# Patient Record
Sex: FEMALE | Race: WHITE | NOT HISPANIC OR LATINO | Employment: FULL TIME | URBAN - METROPOLITAN AREA
[De-identification: names, ages, dates, MRNs, and addresses within clinical notes are randomized per-mention and may not be internally consistent; named-entity substitution may affect disease eponyms.]

---

## 2017-09-08 ENCOUNTER — HOSPITAL ENCOUNTER (EMERGENCY)
Facility: HOSPITAL | Age: 27
Discharge: HOME | End: 2017-09-08
Attending: EMERGENCY MEDICINE
Payer: MEDICAID

## 2017-09-08 VITALS
BODY MASS INDEX: 21.91 KG/M2 | HEIGHT: 63 IN | HEART RATE: 90 BPM | SYSTOLIC BLOOD PRESSURE: 134 MMHG | TEMPERATURE: 98 F | DIASTOLIC BLOOD PRESSURE: 97 MMHG | OXYGEN SATURATION: 100 % | RESPIRATION RATE: 16 BRPM | WEIGHT: 123.69 LBS

## 2017-09-08 DIAGNOSIS — Z77.29 NATURAL GAS EXPOSURE: Primary | ICD-10-CM

## 2017-09-08 PROCEDURE — 99283 EMERGENCY DEPT VISIT LOW MDM: CPT

## 2017-09-08 PROCEDURE — 99282 EMERGENCY DEPT VISIT SF MDM: CPT

## 2017-09-09 NOTE — ED PROVIDER NOTES
"Encounter Date: 9/8/2017    SCRIBE #1 NOTE: I, Anabel Flanagan , am scribing for, and in the presence of,  Dr. Ho . I have scribed the entire note.       History     Chief Complaint   Patient presents with    Chemical Exposure     exposed to gas at friends house today; c/o feeling tired with photophobia       09/08/2017  9:46 PM     Chief Complaint: Chemical exposure       The patient is a 27 y.o. female who is presenting with to the ED s/p possible chemical exposure. The pt reports that she was at her boyfriends house and while getting ready for work "thought she smelt natural gas". The pt endorses a pounding and throbbing global HA, fatigue, and generalized weakness 2 hours after leaving the boyfriend's house. The pt reports that her sx have resolved since arriving to the hospital and being placed on 2L O2 NC and is in NAD. No pertinent PMHx, past surgical hx, or social hx.                 The history is provided by the patient and medical records.     Review of patient's allergies indicates:  No Known Allergies  Past Medical History:   Diagnosis Date    Abnormal Pap smear 3/2011    no treatment    Chronic kidney disease      Past Surgical History:   Procedure Laterality Date    KIDNEY SURGERY      ureteral junction obstruction at [facility] NO 2004     History reviewed. No pertinent family history.  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     Review of Systems   Constitutional: Positive for fatigue. Negative for fever.   HENT: Negative for sore throat.    Eyes: Negative for visual disturbance.   Respiratory: Negative for shortness of breath.    Cardiovascular: Negative for chest pain.   Gastrointestinal: Negative for nausea.   Genitourinary: Negative for dysuria.   Musculoskeletal: Negative for back pain.   Skin: " Negative for rash.   Neurological: Positive for weakness and headaches.   Hematological: Does not bruise/bleed easily.       Physical Exam     Initial Vitals [09/08/17 1923]   BP Pulse Resp Temp SpO2   (!) 134/97 (!) 111 16 98.1 °F (36.7 °C) 99 %      MAP       109.33         Physical Exam    Nursing note and vitals reviewed.  Constitutional: She appears well-nourished.   HENT:   Head: Normocephalic and atraumatic.   Eyes: Conjunctivae and EOM are normal.   Neck: Normal range of motion. Neck supple. No thyroid mass present.   Cardiovascular: Normal rate, regular rhythm, normal heart sounds and intact distal pulses.   Pulmonary/Chest: Breath sounds normal. She has no wheezes. She has no rhonchi. She has no rales.   Abdominal: Soft. Normal appearance and bowel sounds are normal. There is no tenderness.   Musculoskeletal: Normal range of motion. She exhibits no edema or tenderness.   Neurological: She is alert and oriented to person, place, and time. She has normal strength. No cranial nerve deficit or sensory deficit.   Skin: Skin is warm and dry. Capillary refill takes less than 2 seconds. No rash noted. No erythema.   Psychiatric: She has a normal mood and affect. Her speech is normal. Cognition and memory are normal.         ED Course   Procedures  Labs Reviewed - No data to display          Medical Decision Making:   Initial Assessment:   This patient was interviewed and examined and found to be in no acute distress.  At this time she is currently asymptomatic.  She is not reporting any cough or additional concerning signs for potential underlying pneumonitis.  I do not think additional chest x-rays are warranted at this time.  She has been stable since approximately 4-6 hours after exposure.  There are no other individuals associated with concerning symptoms.  She is educated to call professional services and she smells gas once returning home.  She was asked to return to the ER for any new, concerning, or  worsening symptoms.  ED Management:  She is asked to follow-up with her doctor as soon as possible regarding additional improvement.            Scribe Attestation:   Scribe #1: I performed the above scribed service and the documentation accurately describes the services I performed. I attest to the accuracy of the note.    Attending Attestation:           Physician Attestation for Scribe:  Physician Attestation Statement for Scribe #1: I, Dr. Ho , reviewed documentation, as scribed by Anabel Merino  in my presence, and it is both accurate and complete.                 ED Course      Clinical Impression:   The encounter diagnosis was Natural gas exposure.    Disposition:   Disposition: Discharged  Condition: Stable                        Bryce Ho MD  09/09/17 0059